# Patient Record
Sex: FEMALE | Race: WHITE | NOT HISPANIC OR LATINO | Employment: PART TIME | URBAN - METROPOLITAN AREA
[De-identification: names, ages, dates, MRNs, and addresses within clinical notes are randomized per-mention and may not be internally consistent; named-entity substitution may affect disease eponyms.]

---

## 2017-08-04 ENCOUNTER — OFFICE VISIT (OUTPATIENT)
Dept: URGENT CARE | Facility: CLINIC | Age: 52
End: 2017-08-04

## 2017-08-04 VITALS
BODY MASS INDEX: 23.04 KG/M2 | TEMPERATURE: 97.3 F | HEART RATE: 78 BPM | SYSTOLIC BLOOD PRESSURE: 130 MMHG | WEIGHT: 130 LBS | DIASTOLIC BLOOD PRESSURE: 86 MMHG | HEIGHT: 63 IN | RESPIRATION RATE: 14 BRPM | OXYGEN SATURATION: 98 %

## 2017-08-04 DIAGNOSIS — Z76.0 MEDICATION REFILL: ICD-10-CM

## 2017-08-04 DIAGNOSIS — F41.9 ANXIETY: ICD-10-CM

## 2017-08-04 PROCEDURE — 99204 OFFICE O/P NEW MOD 45 MIN: CPT | Performed by: PHYSICIAN ASSISTANT

## 2017-08-04 RX ORDER — PAROXETINE 30 MG/1
30 TABLET, FILM COATED ORAL DAILY
Qty: 30 TAB | Refills: 3 | Status: SHIPPED | OUTPATIENT
Start: 2017-08-04 | End: 2017-12-18

## 2017-08-04 ASSESSMENT — ENCOUNTER SYMPTOMS
EYES NEGATIVE: 1
MUSCULOSKELETAL NEGATIVE: 1
CONSTITUTIONAL NEGATIVE: 1
CARDIOVASCULAR NEGATIVE: 1
HALLUCINATIONS: 0
RESPIRATORY NEGATIVE: 1
NEUROLOGICAL NEGATIVE: 1
CONSTIPATION: 0
NERVOUS/ANXIOUS: 1
DIARRHEA: 0
DEPRESSION: 0
HEADACHES: 0
INSOMNIA: 0
ABDOMINAL PAIN: 0

## 2017-08-04 ASSESSMENT — LIFESTYLE VARIABLES: SUBSTANCE_ABUSE: 0

## 2017-08-04 NOTE — MR AVS SNAPSHOT
"        Jessica Segundo   2017 5:30 PM   Office Visit   MRN: [id number]    Department:  [facility]   Dept Phone:  [phone]    Description:  Female : 1965   Provider:  Tod Son PA-C           Reason for Visit     Other refill for paroxetine      Allergies as of 2017     Allergen Noted Reactions    Vicodin [Hydrocodone-Acetaminophen] 2013   Itching      You were diagnosed with     Medication refill   [516196]       Anxiety   [899463]         Vital Signs     Blood Pressure Pulse Temperature Respirations Height Weight    130/86 mmHg 78 36.3 °C (97.3 °F) 14 1.6 m (5' 2.99\") 58.968 kg (130 lb)    Body Mass Index Oxygen Saturation Smoking Status             23.03 kg/m2 98% Never Smoker          Basic Information     Date Of Birth Sex Race Ethnicity Preferred Language    1965 Female White Non- English      Problem List              ICD-10-CM Priority Class Noted - Resolved    Anxiety F41.9   3/4/2013 - Present    HTN (hypertension) (Chronic) I10   3/18/2013 - Present    OSTEOPOROSIS (Chronic)    3/18/2013 - Present      Health Maintenance        Date Due Completion Dates    IMM DTaP/Tdap/Td Vaccine (1 - Tdap) 7/3/1984 ---    PAP SMEAR 7/3/1986 ---    MAMMOGRAM 7/3/2005 ---    COLONOSCOPY 7/3/2015 ---    IMM INFLUENZA (1) 2017 ---            Current Immunizations     No immunizations on file.      Below and/or attached are the medications your provider expects you to take. Review all of your home medications and newly ordered medications with your provider and/or pharmacist. Follow medication instructions as directed by your provider and/or pharmacist. Please keep your medication list with you and share with your provider. Update the information when medications are discontinued, doses are changed, or new medications (including over-the-counter products) are added; and carry medication information at all times in the event of emergency situations     Allergies:  VICODIN - " Itching               Medications  Valid as of: August 04, 2017 -  6:24 PM    Generic Name Brand Name Tablet Size Instructions for use    PARoxetine HCl (Tab) PAXIL 30 MG TAKE 1 TABLET ORALLY EVERY DAY WITH FOOD        PARoxetine HCl (Tab) PAXIL 30 MG Take 1 Tab by mouth every day.        .                 Medicines prescribed today were sent to:     [facility][id number] - [place], [place] - [address]    [address] [place] [address]    Phone: [phone] Fax: [phone]    Open 24 Hours?: No      Medication refill instructions:       If your prescription bottle indicates you have medication refills left, it is not necessary to call your provider’s office. Please contact your pharmacy and they will refill your medication.    If your prescription bottle indicates you do not have any refills left, you may request refills at any time through one of the following ways: The online [employer] system (except Urgent Care), by calling your provider’s office, or by asking your pharmacy to contact your provider’s office with a refill request. Medication refills are processed only during regular business hours and may not be available until the next business day. Your provider may request additional information or to have a follow-up visit with you prior to refilling your medication.   *Please Note: Medication refills are assigned a new Rx number when refilled electronically. Your pharmacy may indicate that no refills were authorized even though a new prescription for the same medication is available at the pharmacy. Please request the medicine by name with the pharmacy before contacting your provider for a refill.        Instructions    Work on exercise: 30-40 minutes 3-4 times a week.  Talk to GP about celexa and welbutrin combo.          [employer] Access Code: [email or web address]  Expires: 9/3/2017  6:24 PM    [employer]  A secure, online tool to manage your health information     [employer]® is a secure, online  tool that connects you to your personalized health information from the privacy of your home -- day or night - making it very easy for you to manage your healthcare. Once the activation process is completed, you can even access your medical information using the [employer] darryl, which is available for free in the [employer] store or Google Play store.     [employer] provides the following levels of access (as shown below):   My Chart Features   [facility] Doctor [facility]  Specialists [facility]  Urgent  Care [facility]  Primary Care  Doctor   Email your healthcare team securely and privately 24/7 X X X    Manage appointments: schedule your next appointment; view details of past/upcoming appointments X      Request prescription refills. X      View recent personal medical records, including lab and immunizations X X X X   View health record, including health history, allergies, medications X X X X   Read reports about your outpatient visits, procedures, consult and ER notes X X X X   See your discharge summary, which is a recap of your hospital and/or ER visit that includes your diagnosis, lab results, and care plan. X X       How to register for [employer]:  1. Go to  [email or web address].  2. Click on the Sign Up Now box, which takes you to the New Member Sign Up page. You will need to provide the following information:  a. Enter your [employer] Access Code exactly as it appears at the top of this page. (You will not need to use this code after you’ve completed the sign-up process. If you do not sign up before the expiration date, you must request a new code.)   b. Enter your date of birth.   c. Enter your home email address.   d. Click Submit, and follow the next screen’s instructions.  3. Create a [employer]Store[employer] ID. This will be your [employer] login ID and cannot be changed, so think of one that is secure and easy to remember.  4. Create a [employer] password. You can change your password at any time.  5. Enter your Password  Reset Question and Answer. This can be used at a later time if you forget your password.   6. Enter your e-mail address. This allows you to receive e-mail notifications when new information is available in [employer].  7. Click Sign Up. You can now view your health information.    For assistance activating your [employer] account, call [phone]

## 2017-08-05 NOTE — PROGRESS NOTES
Subjective:      Jessica Segundo is a 52 y.o. female who presents with Other            Other  Pertinent negatives include no abdominal pain or headaches.     Chief Complaint   Patient presents with   • Other     refill for paroxetine       HPI:  Jessica Segundo is a 52 y.o. Female who presents for paxil 30mg refill.  Treating anxiety.  Starting new job.  General stress but no panic tests.  No suicidal or homicidal idieology.  She has been on the paxil for 30 days and has been feeling well controlled. She will be without insurance for the next 3 months.  She has 4-5 tabs left and was unable to get into PCP next week.  Patient denies HA, SOB, chest pain, palpitations, fever, chills, or n/v/d.      Past Medical History   Diagnosis Date   • Anxiety    • Depression        History reviewed. No pertinent past surgical history.    Family History   Problem Relation Age of Onset   • Hypertension Mother        Social History     Social History   • Marital Status: Single     Spouse Name: N/A   • Number of Children: N/A   • Years of Education: N/A     Occupational History   • Not on file.     Social History Main Topics   • Smoking status: Never Smoker    • Smokeless tobacco: Never Used   • Alcohol Use: 0.5 oz/week     1 Standard drinks or equivalent per week   • Drug Use: No   • Sexual Activity: Not on file     Other Topics Concern   • Not on file     Social History Narrative         Current outpatient prescriptions:   •  paroxetine, TAKE 1 TABLET ORALLY EVERY DAY WITH FOOD, 8/4/2017    Allergies   Allergen Reactions   • Vicodin [Hydrocodone-Acetaminophen] Itching            Review of Systems   Constitutional: Negative.    HENT: Negative.    Eyes: Negative.    Respiratory: Negative.    Cardiovascular: Negative.    Gastrointestinal: Negative for abdominal pain, diarrhea and constipation.   Genitourinary: Negative.    Musculoskeletal: Negative.    Skin: Negative.    Neurological: Negative.  Negative for headaches.  "  Endo/Heme/Allergies: Negative.    Psychiatric/Behavioral: Negative for depression, suicidal ideas, hallucinations and substance abuse. The patient is nervous/anxious. The patient does not have insomnia.           Objective:     /86 mmHg  Pulse 78  Temp(Src) 36.3 °C (97.3 °F)  Resp 14  Ht 1.6 m (5' 2.99\")  Wt 58.968 kg (130 lb)  BMI 23.03 kg/m2  SpO2 98%     Physical Exam       Nursing note reviewed.    Constitutional:  Appropriately groomed, pleasant affect, well nourished, and in no acute distress.     HEENT:  Head: Atraumatic, normocephalic.    Eyes:  EOMs full.  Conjunctivae clear, sclera white, and medial canthus without exudate bilaterally.    Ears:  Hearing grossly intact to voice.    Nose:  Nares patent bilaterally.  No rhinorrhea noted.  No lesions.    Neck:  FROM.      Lungs:  Lungs with normal respiratory excursion and effort.      Muscle skeletal:  Full range of motion for upper extremities.     Derm:  No rash noted on neck or face.     Psychiatric:  Normal judgement, mood and affect.       Assessment/Plan:     1. Medication refill  paroxetine (PAXIL) 30 MG Tab   2. Anxiety        Patient presents for refill of paxil 30mg.  She is doing well on this medication for anxiety and has been taking it for 1 month.  No suicidal or homicidal ideation.  I did recommend she f/u with GP to discuss alternatives if paxil fails to help with anxiety.  Refilled patient's medication and advised her to increase exercise to help with her anxiety.    Patient was in agreement with this treatment plan and seemed to understand without barriers. All questions were encouraged and answered.  Reviewed signs and symptoms of when to seek emergency medical care.     Please note that this dictation was created using voice recognition software.  I have made every reasonable attempt to correct obvious errors, but I expect there are errors of yoana and possibly content that I did not discover before finalizing the note. "

## 2018-11-09 PROBLEM — G43.001 MIGRAINE WITHOUT AURA AND WITH STATUS MIGRAINOSUS, NOT INTRACTABLE: Status: ACTIVE | Noted: 2018-11-09

## 2018-11-09 PROBLEM — M72.0 DUPUYTREN CONTRACTURE: Status: ACTIVE | Noted: 2018-11-09

## 2018-11-09 PROBLEM — H92.02 LEFT EAR PAIN: Status: ACTIVE | Noted: 2018-11-09

## 2018-11-09 PROBLEM — M54.2 CHRONIC NECK PAIN: Status: ACTIVE | Noted: 2018-11-09

## 2018-11-09 PROBLEM — G89.29 CHRONIC NECK PAIN: Status: ACTIVE | Noted: 2018-11-09

## 2018-11-26 PROBLEM — H61.21 CERUMEN DEBRIS ON TYMPANIC MEMBRANE OF RIGHT EAR: Status: ACTIVE | Noted: 2018-11-26

## 2019-01-23 PROBLEM — D70.8 OTHER NEUTROPENIA (HCC): Status: ACTIVE | Noted: 2019-01-23

## 2019-11-27 PROBLEM — H61.21 CERUMEN DEBRIS ON TYMPANIC MEMBRANE OF RIGHT EAR: Status: RESOLVED | Noted: 2018-11-26 | Resolved: 2019-11-27

## 2019-11-27 PROBLEM — H92.02 LEFT EAR PAIN: Status: RESOLVED | Noted: 2018-11-09 | Resolved: 2019-11-27
